# Patient Record
Sex: MALE | URBAN - METROPOLITAN AREA
[De-identification: names, ages, dates, MRNs, and addresses within clinical notes are randomized per-mention and may not be internally consistent; named-entity substitution may affect disease eponyms.]

---

## 2017-05-11 ENCOUNTER — FOLLOW UP ESTABLISHED (OUTPATIENT)
Dept: URBAN - METROPOLITAN AREA CLINIC 30 | Facility: CLINIC | Age: 62
End: 2017-05-11
Payer: COMMERCIAL

## 2017-05-11 DIAGNOSIS — Z79.84 LONG TERM (CURRENT) USE OF ORAL ANTIDIABETIC DRUGS: ICD-10-CM

## 2017-05-11 DIAGNOSIS — H25.813 COMBINED FORMS OF AGE-RELATED CATARACT, BILATERAL: ICD-10-CM

## 2017-05-11 PROCEDURE — 92014 COMPRE OPH EXAM EST PT 1/>: CPT | Performed by: OPTOMETRIST

## 2017-05-11 PROCEDURE — 92250 FUNDUS PHOTOGRAPHY W/I&R: CPT | Performed by: OPTOMETRIST

## 2017-05-11 PROCEDURE — 92015 DETERMINE REFRACTIVE STATE: CPT | Performed by: OPTOMETRIST

## 2017-05-11 ASSESSMENT — VISUAL ACUITY
OD: 20/20
OS: 20/20

## 2017-05-11 ASSESSMENT — INTRAOCULAR PRESSURE
OD: 17
OS: 17

## 2019-03-01 ENCOUNTER — FOLLOW UP ESTABLISHED (OUTPATIENT)
Dept: URBAN - METROPOLITAN AREA CLINIC 30 | Facility: CLINIC | Age: 64
End: 2019-03-01
Payer: COMMERCIAL

## 2019-03-01 DIAGNOSIS — E11.9 TYPE 2 DIABETES MELLITUS WITHOUT COMPLICATIONS: Primary | ICD-10-CM

## 2019-03-01 PROCEDURE — 92014 COMPRE OPH EXAM EST PT 1/>: CPT | Performed by: OPTOMETRIST

## 2019-03-01 PROCEDURE — 92134 CPTRZ OPH DX IMG PST SGM RTA: CPT | Performed by: OPTOMETRIST

## 2019-03-01 PROCEDURE — 92015 DETERMINE REFRACTIVE STATE: CPT | Performed by: OPTOMETRIST

## 2019-03-01 ASSESSMENT — INTRAOCULAR PRESSURE
OD: 16
OS: 16

## 2019-03-01 ASSESSMENT — KERATOMETRY
OD: 44.15
OS: 44.27

## 2019-03-01 ASSESSMENT — VISUAL ACUITY
OS: 20/25
OD: 20/25

## 2021-07-22 ENCOUNTER — OFFICE VISIT (OUTPATIENT)
Dept: URBAN - METROPOLITAN AREA CLINIC 30 | Facility: CLINIC | Age: 66
End: 2021-07-22
Payer: COMMERCIAL

## 2021-07-22 DIAGNOSIS — Z79.4 LONG TERM (CURRENT) USE OF INSULIN: ICD-10-CM

## 2021-07-22 DIAGNOSIS — H43.393 OTHER VITREOUS OPACITIES, BILATERAL: ICD-10-CM

## 2021-07-22 PROCEDURE — 92134 CPTRZ OPH DX IMG PST SGM RTA: CPT | Performed by: OPTOMETRIST

## 2021-07-22 PROCEDURE — 92014 COMPRE OPH EXAM EST PT 1/>: CPT | Performed by: OPTOMETRIST

## 2021-07-22 ASSESSMENT — VISUAL ACUITY
OD: 20/20
OS: 20/25

## 2021-07-22 ASSESSMENT — KERATOMETRY
OS: 44.30
OD: 43.98

## 2021-07-22 ASSESSMENT — INTRAOCULAR PRESSURE
OS: 17
OD: 16

## 2021-07-22 NOTE — IMPRESSION/PLAN
Impression: Combined forms of age-related cataract, bilateral Plan: Continue to monitor. New spec Rx today.

## 2021-07-22 NOTE — IMPRESSION/PLAN
Impression: Other vitreous opacities, bilateral Plan: Stable. Retinas are flat and attached OU. Reviewed s/sx of RD and to call asap if occurs. MAC OCT WNL OU.

## 2022-10-21 ENCOUNTER — OFFICE VISIT (OUTPATIENT)
Dept: URBAN - METROPOLITAN AREA CLINIC 30 | Facility: CLINIC | Age: 67
End: 2022-10-21
Payer: COMMERCIAL

## 2022-10-21 DIAGNOSIS — H43.813 VITREOUS DEGENERATION, BILATERAL: ICD-10-CM

## 2022-10-21 DIAGNOSIS — E11.9 TYPE 2 DIABETES MELLITUS WITHOUT COMPLICATIONS: Primary | ICD-10-CM

## 2022-10-21 DIAGNOSIS — Z79.4 LONG TERM (CURRENT) USE OF INSULIN: ICD-10-CM

## 2022-10-21 DIAGNOSIS — H25.813 COMBINED FORMS OF AGE-RELATED CATARACT, BILATERAL: ICD-10-CM

## 2022-10-21 DIAGNOSIS — H52.4 PRESBYOPIA: ICD-10-CM

## 2022-10-21 PROCEDURE — 92134 CPTRZ OPH DX IMG PST SGM RTA: CPT | Performed by: OPTOMETRIST

## 2022-10-21 PROCEDURE — 92014 COMPRE OPH EXAM EST PT 1/>: CPT | Performed by: OPTOMETRIST

## 2022-10-21 ASSESSMENT — KERATOMETRY
OD: 43.70
OS: 44.38

## 2022-10-21 ASSESSMENT — INTRAOCULAR PRESSURE
OD: 18
OS: 18

## 2022-10-21 ASSESSMENT — VISUAL ACUITY
OD: 20/20
OS: 20/20

## 2022-10-21 NOTE — IMPRESSION/PLAN
Impression: Combined forms of age-related cataract, bilateral Plan: OD>OS. Discussed cataract diagnosis with the patient. Cataract surgery not required.

## 2022-10-21 NOTE — IMPRESSION/PLAN
Impression: Vitreous degeneration, bilateral: H43.813. Plan: Has noticed flashes of light in the last couple weeks. Pt educ on findings. Retinas are flat and attached OU. Reviewed s/sx of RD and to call asap if occurs. MAC OCT WNL OU. Recheck 6 weeks/sooner prn.

## 2024-07-29 ENCOUNTER — OFFICE VISIT (OUTPATIENT)
Dept: URBAN - METROPOLITAN AREA CLINIC 30 | Facility: CLINIC | Age: 69
End: 2024-07-29
Payer: COMMERCIAL

## 2024-07-29 DIAGNOSIS — H52.4 PRESBYOPIA: ICD-10-CM

## 2024-07-29 DIAGNOSIS — H25.813 COMBINED FORMS OF AGE-RELATED CATARACT, BILATERAL: ICD-10-CM

## 2024-07-29 DIAGNOSIS — Z79.4 LONG TERM (CURRENT) USE OF INSULIN: ICD-10-CM

## 2024-07-29 DIAGNOSIS — E11.9 TYPE 2 DIABETES MELLITUS WITHOUT COMPLICATIONS: Primary | ICD-10-CM

## 2024-07-29 DIAGNOSIS — H43.813 VITREOUS DEGENERATION, BILATERAL: ICD-10-CM

## 2024-07-29 PROCEDURE — 92134 CPTRZ OPH DX IMG PST SGM RTA: CPT | Performed by: OPTOMETRIST

## 2024-07-29 PROCEDURE — 92014 COMPRE OPH EXAM EST PT 1/>: CPT | Performed by: OPTOMETRIST

## 2024-07-29 ASSESSMENT — INTRAOCULAR PRESSURE
OS: 19
OD: 19

## 2024-07-29 ASSESSMENT — VISUAL ACUITY
OS: 20/20
OD: 20/25

## 2024-07-29 ASSESSMENT — KERATOMETRY
OD: 43.81
OS: 44.18